# Patient Record
Sex: FEMALE | Race: WHITE
[De-identification: names, ages, dates, MRNs, and addresses within clinical notes are randomized per-mention and may not be internally consistent; named-entity substitution may affect disease eponyms.]

---

## 2020-06-22 ENCOUNTER — HOSPITAL ENCOUNTER (OUTPATIENT)
Dept: HOSPITAL 56 - MW.SDS | Age: 65
Discharge: HOME | End: 2020-06-22
Attending: OBSTETRICS & GYNECOLOGY
Payer: COMMERCIAL

## 2020-06-22 VITALS — HEART RATE: 84 BPM | DIASTOLIC BLOOD PRESSURE: 60 MMHG | SYSTOLIC BLOOD PRESSURE: 130 MMHG

## 2020-06-22 DIAGNOSIS — N84.1: Primary | ICD-10-CM

## 2020-06-22 DIAGNOSIS — I10: ICD-10-CM

## 2020-06-22 DIAGNOSIS — Z88.2: ICD-10-CM

## 2020-06-22 DIAGNOSIS — Z91.013: ICD-10-CM

## 2020-06-22 DIAGNOSIS — Z88.1: ICD-10-CM

## 2020-06-22 DIAGNOSIS — N85.8: ICD-10-CM

## 2020-06-22 DIAGNOSIS — G47.30: ICD-10-CM

## 2020-06-22 DIAGNOSIS — F32.9: ICD-10-CM

## 2020-06-22 DIAGNOSIS — Z79.899: ICD-10-CM

## 2020-06-22 DIAGNOSIS — E03.9: ICD-10-CM

## 2020-06-22 DIAGNOSIS — Z91.030: ICD-10-CM

## 2020-06-22 DIAGNOSIS — F41.9: ICD-10-CM

## 2020-06-22 DIAGNOSIS — Z87.891: ICD-10-CM

## 2020-06-22 DIAGNOSIS — Z79.890: ICD-10-CM

## 2020-06-22 PROCEDURE — 85027 COMPLETE CBC AUTOMATED: CPT

## 2020-06-22 PROCEDURE — 58558 HYSTEROSCOPY BIOPSY: CPT

## 2020-06-22 PROCEDURE — 36415 COLL VENOUS BLD VENIPUNCTURE: CPT

## 2020-06-22 NOTE — PCM.PREANE
Preanesthetic Assessment





- Anesthesia/Transfusion/Family Hx


Anesthesia History: Prior Anesthesia Without Reaction


Family History of Anesthesia Reaction: No


Transfusion History: No Prior Transfusion(s)


Intubation History: Unknown





- Review of Systems


General: No Symptoms


Pulmonary: No Symptoms


Cardiovascular: No Symptoms


Gastrointestinal: No Symptoms


Neurological: No Symptoms


Other: Reports: None





- Physical Assessment


Vital Signs: 





                                Last Vital Signs











Temp  36.8 C   06/22/20 09:40


 


Pulse  81   06/22/20 09:40


 


Resp  14   06/22/20 09:40


 


BP  140/75   06/22/20 09:40


 


Pulse Ox  99   06/22/20 09:40











Height: 5 ft 6 in


Weight: 73.936 kg


ASA Class: 2


Mental Status: Alert & Oriented x3


Airway Class: Mallampati = 2


Dentition: Reports: Normal Dentition


Thyro-Mental Finger Breadths: 3


Mouth Opening Finger Breadths: 3


ROM/Head Extension: Full


Lungs: Clear to Auscultation, Normal Respiratory Effort


Cardiovascular: Regular Rate, Regular Rhythm





- Lab


Values: 





                             Laboratory Last Values











WBC  5.33 K/uL (4.0-11.0)   06/19/20  11:15    


 


RBC  5.32 M/uL (4.30-5.90)   06/19/20  11:15    


 


Hgb  16.3 g/dL (12.0-16.0)  H  06/19/20  11:15    


 


Hct  47.8 % (36.0-46.0)  H  06/19/20  11:15    


 


MCV  89.8 fL (80.0-98.0)   06/19/20  11:15    


 


MCH  30.6 pg (27.0-32.0)   06/19/20  11:15    


 


MCHC  34.1 g/dL (31.0-37.0)   06/19/20  11:15    


 


RDW Std Deviation  41.4 fl (28.0-62.0)   06/19/20  11:15    


 


RDW Coeff of Arleth  13 % (11.0-15.0)   06/19/20  11:15    


 


Plt Count  217 K/uL (150-400)   06/19/20  11:15    


 


MPV  10.30 fL (7.40-12.00)   06/19/20  11:15    


 


Nucleated RBC %  0.0 /100WBC  06/19/20  11:15    


 


Nucleated RBCs #  0 K/uL  06/19/20  11:15    














- Allergies


Allergies/Adverse Reactions: 


                                    Allergies











Allergy/AdvReac Type Severity Reaction Status Date / Time


 


adhesive tape Allergy  Blisters Verified 06/17/20 13:50


 


erythromycin base Allergy  Anaphylactic Verified 06/17/20 13:50





[Erythromycin Base]   Shock  


 


iodine Allergy  Anaphylactic Verified 06/17/20 13:50





   Shock  


 


peanut Allergy  Anaphylactic Verified 06/17/20 13:50





   Shock  


 


shellfish derived Allergy  Anaphylactic Verified 06/17/20 13:50





   Shock  


 


Sulfa (Sulfonamide Allergy  Anaphylactic Verified 06/17/20 13:50





Antibiotics)   Shock  


 


tree nut Allergy  Anaphylactic Verified 06/17/20 13:50





   Shock  


 


venom-honey bee Allergy  Cannot Verified 06/17/20 13:50





[bee venom (honey bee)]   Remember  


 


seafood Allergy  Anaphylactic Uncoded 06/17/20 13:50





   Shock  














- Blood


Blood Available: No





- Anesthesia Plan


Pre-Op Medication Ordered: None





- Acknowledgements


Anesthesia Type Planned: General Anesthesia


Pt an Appropriate Candidate for the Planned Anesthesia: Yes


Alternatives and Risks of Anesthesia Discussed w Pt/Guardian: Yes


Pt/Guardian Understands and Agrees with Anesthesia Plan: Yes





PreAnesthesia Questionnaire


HEENT History: Reports: Other (See Below)


Other HEENT History: wears glasses


Cardiovascular History: Reports: Arrhythmia, Hypertension


Other Cardiovascular History: takes Metoprolol for tachycardia


Gastrointestinal History: Reports: Other (See Below)


Other Gastrointestinal History: occasional heartburn- takes Tums


Neurological History: Reports: Migraines


Psychiatric History: Reports: Anxiety, Depression


Endocrine/Metabolic History: Reports: Hypothyroidism


Oncologic (Cancer) History: Reports: Thyroid ('80)





- Past Surgical History


Head Surgeries/Procedures: Reports: None


HEENT Surgical History: Reports: Tonsillectomy


GI Surgical History: Reports: Colonoscopy


Female  Surgical History: Reports: Cystectomy, Other (See Below)


Other Female  Surgeries/Procedures: removal of ovarian cyst with salpinge

ctomy, hysterectomy x2 '10 and '11, vaginal septum repair


Endocrine Surgical History: Reports: Thyroidectomy





- SUBSTANCE USE


Smoking Status *Q: Former Smoker


Tobacco Use Within Last Twelve Months: No


Recreational Drug Use History: No





- HOME MEDS


Home Medications: 


                                    Home Meds





Acetaminophen/Diphenhydramine [Tylenol Pm Ex-Strength Caplet] 1 tab PO BEDTIME 

PRN 06/17/20 [History]


Estradiol [Sue] 1 patch TOP ASDIRECTED 06/17/20 [History]


FLUoxetine [PROzac] 40 mg PO BEDTIME 06/17/20 [History]


Levothyroxine [Synthroid] 100 mcg PO QAM 06/17/20 [History]


Loratadine [Claritin] 10 mg PO DAILY PRN 06/17/20 [History]


Metoprolol Succinate 50 mg PO BEDTIME 06/17/20 [History]


Rizatriptan Benzoate [Rizatriptan] 10 mg PO ASDIRECTED PRN 06/17/20 [History]


medroxyPROGESTERone [Provera] 10 mg PO BEDTIME 06/17/20 [History]











- CURRENT (IN HOUSE) MEDS


Current Meds: 





                               Current Medications





Lactated Ringer's (Ringers, Lactated)  1,000 mls @ 100 mls/hr IV ASDIRECTED KAMILLE


Sodium Chloride (Saline Flush)  10 ml FLUSH ASDIRECTED PRN


   PRN Reason: Keep Vein Open


Sodium Chloride (Saline Flush)  2.5 ml FLUSH ASDIRECTED PRN


   PRN Reason: Keep Vein Open


Sodium Chloride (Normal Saline)  10 ml IV ASDIRECTED PRN


   PRN Reason: IV Use

## 2020-06-22 NOTE — [id number]
Post Anesthesia Note





- EVALUATION WITHIN 48HRS OF ANESTHETIC


Vital Signs in Normal Range: Yes


Patient Participated in Evaluation: Yes


Respiratory Function Stable: Yes


Airway Patent: Yes


Cardiovascular Function Stable: Yes


Hydration Status Stable: Yes


Pain Control Satisfactory: Yes


Nausea and Vomiting Control Satisfactory: Yes


Mental Status Recovered: Yes


Vital Signs: 


                                Last Vital Signs











Temp  36.9 C   06/22/20 12:34


 


Pulse  80   06/22/20 12:34


 


Resp  14   06/22/20 12:34


 


BP  130/66   06/22/20 12:34


 


Pulse Ox  97   06/22/20 12:34














- COMMENTS/OBSERVATIONS


Free Text/Narrative:: 





No anesthesia problems

## 2020-06-22 NOTE — OR
SURGEON:

Cintia Brennan M.D.

 

DATE OF PROCEDURE:  06/22/2020

 

PREOPERATIVE DIAGNOSIS:

Postmenopausal bleeding.

 

POSTOPERATIVE DIAGNOSIS:

Postmenopausal bleeding.

 

PROCEDURE:

Operative hysteroscopy with endocervical polypectomy and fractional D and C.

 

ANESTHESIA:

General LMA.

 

ESTIMATED BLOOD LOSS:

Less than 5 mL.

 

COMPLICATIONS:

None known.

 

FINDINGS:

Endocervical polyp, posterior side, otherwise atrophic- appearing endometrium.

 

DISPOSITION:

The patient to PACU, stable.

 

SPECIMEN:

To pathology.

 

PROCEDURE IN DETAIL:

Debra is a 65-year-old postmenopausal female who has had an episode of

postmenopausal bleeding.  Pelvic sonogram reveals a thickened endometrium.

Therefore, further evaluation with hysteroscopy has been advised.  She agrees to

proceed in this direction.  Risks of procedure have been discussed with her.

Proper consent obtained.

 

The patient was taken to the operating room where she underwent general LMA, was

placed in modified dorsal lithotomy position, prepped and draped in the usual

sterile fashion.  SCDs to the lower extremities.  Bladder was drained.  Time-out

was performed.

 

Speculum was introduced in the vagina.  Anterior lip of the cervix was

visualized.  Cervix was gently dilated to 6 mm using MyoSure hysteroscope.  This

was introduced using normal saline as distention media.  The uterine cavity was

able to be visualized.  The right ostia followed by the left ostia was able to

be visualized.  Photographs taken.  Fundus, lower uterine segment, and

endocervix are now visualized.  There is a small polypoid lesion in the

endocervical portion of the uterine cavity.  MyoSure  was used to resect the

endometrium and in the region of the endocervical polyp.  Specimen will be sent

to pathology.  A gentle fractional D and C was now performed with a curettage of

the endocervical tissue followed by curettage of the endometrium.  A specimen

will be sent to pathology.  The patient has tolerated the procedure well

overall.  She will go to PACU in stable condition.  All instruments removed from

the vagina.  Sponge and instrument count were correct.

 

 

BERNARDO / JENNY

DD:  06/22/2020 11:56:13

DT:  06/22/2020 13:32:37

Job #:  [id number]

## 2020-06-22 NOTE — PCM.POSTAN
POST ANESTHESIA ASSESSMENT





- MENTAL STATUS


Mental Status: Alert, Oriented





- VITAL SIGNS


Vital Signs: 


                                Last Vital Signs











Temp  36.7 C   06/22/20 11:59


 


Pulse  83   06/22/20 12:26


 


Resp  12   06/22/20 12:26


 


BP  134/69   06/22/20 12:26


 


Pulse Ox  96   06/22/20 12:26














- RESPIRATORY


Respiratory Status: Respiratory Rate WNL, Airway Patent, O2 Saturation Stable





- CARDIOVASCULAR


CV Status: Pulse Rate WNL, Blood Pressure Stable





- GASTROINTESTINAL


GI Status: No Symptoms





- PAIN


Pain Score: 0





- POST OP HYDRATION


Hydration Status: Adequate & Stable





- OBSERVATIONS


Free Text/Narrative:: 





No anesthesia problems

## 2020-06-22 NOTE — PCM.OPNOTE
- General Post-Op/Procedure Note


Date of Surgery/Procedure: 06/22/20


Operative Procedure(s): Operative hysteroscopy with polypectomy.  Fractional D&C


Findings: 





Endocervical polyp otherwise atrophic endometrium 


Pre Op Diagnosis: Postmenopausal bleeding


Post-Op Diagnosis: Same


Anesthesia Technique: General LMA


Primary Surgeon: Cintia Brennan


Pathology: 





Endocervical polyp


Endometrial and endocervical curretings


Fluid Replacement, Intraop: 500


EBL in mLs: 5 (FLuid deficit 300Ml NS)


Complications: none known


Condition: Good


Free Text/Narrative:: 


Dictation [id number]

## 2021-03-02 ENCOUNTER — HOSPITAL ENCOUNTER (OUTPATIENT)
Dept: HOSPITAL 56 - MW.SDS | Age: 66
Discharge: HOME | End: 2021-03-02
Attending: SURGERY
Payer: MEDICARE

## 2021-03-02 VITALS — HEART RATE: 84 BPM | SYSTOLIC BLOOD PRESSURE: 131 MMHG | DIASTOLIC BLOOD PRESSURE: 64 MMHG

## 2021-03-02 DIAGNOSIS — K80.64: Primary | ICD-10-CM

## 2021-03-02 DIAGNOSIS — Z79.84: ICD-10-CM

## 2021-03-02 DIAGNOSIS — Z91.010: ICD-10-CM

## 2021-03-02 DIAGNOSIS — E03.9: ICD-10-CM

## 2021-03-02 DIAGNOSIS — Z79.899: ICD-10-CM

## 2021-03-02 DIAGNOSIS — Z91.048: ICD-10-CM

## 2021-03-02 DIAGNOSIS — Z88.8: ICD-10-CM

## 2021-03-02 DIAGNOSIS — Z98.890: ICD-10-CM

## 2021-03-02 DIAGNOSIS — Z91.013: ICD-10-CM

## 2021-03-02 DIAGNOSIS — E78.00: ICD-10-CM

## 2021-03-02 DIAGNOSIS — Z87.891: ICD-10-CM

## 2021-03-02 DIAGNOSIS — E11.9: ICD-10-CM

## 2021-03-02 DIAGNOSIS — Z88.2: ICD-10-CM

## 2021-03-02 DIAGNOSIS — Z91.030: ICD-10-CM

## 2021-03-02 DIAGNOSIS — Z79.890: ICD-10-CM

## 2021-03-02 PROCEDURE — 88304 TISSUE EXAM BY PATHOLOGIST: CPT

## 2021-03-02 PROCEDURE — 47562 LAPAROSCOPIC CHOLECYSTECTOMY: CPT

## 2021-03-02 NOTE — OR
SURGEON:

DALIA CHAPPELL MD

 

DATE OF PROCEDURE:  03/02/2021

 

PREOPERATIVE DIAGNOSIS:

Symptomatic cholelithiasis.

 

POSTOPERATIVE DIAGNOSIS:

Chronic cholecystitis secondary to biliary calculus.

 

PROCEDURE PERFORMED:

Laparoscopic cholecystectomy.

 

PRIMARY SURGEON:

Dalia Chappell MD

 

ANESTHESIA:

General endotracheal anesthesia.

 

FLUIDS:

1300 mL of crystalloid.

 

ESTIMATED BLOOD LOSS:

10 mL.

 

URINE OUTPUT:

120 mL.

 

FINDINGS:

Gallbladder with dense adhesions proximally to the liver capsule.  Omental

adhesions to the abdominal wall.

 

COMPLICATIONS:

None.

 

INDICATIONS:

The patient is a 66-year-old female who presents with symptomatic

cholelithiasis.  I explained the need for a laparoscopic, possible open,

cholecystectomy.  The patient and I discussed the procedure; expected

perioperative course; and the risks including bleeding, infection, or damage to

surrounding structures.  The patient verbalized understanding and wishes to

proceed.

 

PROCEDURE IN DETAIL:

The patient was brought into the OR and placed on the OR table in supine

position.  A time-out was completed verifying the patient's name, age, date of

birth, allergies, and procedure to be performed.  General endotracheal

anesthesia was induced.  The left arm was tucked to the patient's side and a

Gates catheter placed.  The abdomen was prepped and draped in usual standard

fashion.  I anesthetized the infraumbilical fold with 0.5% Marcaine plain.  An

11 blade was used to make an incision along the infraumbilical fold.  Cautery

was used to dissect down to the subcutaneous fat layer.  I then bluntly

dissected down to the fascia.  The anterior fascia was elevated and incised

sharply with curved Washburn scissors.  I then identified the posterior rectus

sheath.  This was elevated and incised as well.  I then identified the

peritoneum.  This was grasped with hemostats and incised sharply.  Entry into

the abdomen was palpated digitally.  Stay sutures were placed on either side of

the fascia using 0 Vicryl suture.  A 12 mm Kendall trocar was inserted in the

abdomen.  The abdomen was insufflated and a 5 mm 30-degree scope was inserted.

I closely inspected the area underneath my initial trocar placement.  No damage

to surrounding structures was noted.  The patient was placed into reverse

Trendelenburg position and airplaned slightly to the left.  5 mm trocars were

placed in the following locations under direct visualization; one in the

epigastric area, one in the right flank, and one 2 fingerbreadths below the

right subcostal margin in the midclavicular line.  The patient was noted to have

adhesions of the omentum up along the upper midline.  These were taken down with

hook cautery.  I then grasped the top of my gallbladder with an atraumatic

grasper and elevated the gallbladder anteriorly.  The infundibulum had some

adhesions to the lateral liver capsule.  Using hook cautery, I was able to take

some of these down.  I then turned my attention to the medial edge.  I began my

dissection of the critical view using a combination of blunt dissection as well

as hook cautery.  Again, the patient had dense adhesions around the

infundibulum.  I was able to clear away half of the cystic plate, which allowed

me more mobility with my proximal dissection.  I was able to completely dissect

free the cystic artery.  The cystic duct was somewhat thickened and had dense

adhesions around it.  I took these down meticulously.  Once I was assured of my

critical view, I doubly clipped and ligated my cystic artery.  I then continued

dissection down the cystic duct.  A small rent was made at the juncture of the

gallbladder and the cystic duct.  I triply clipped and ligated the cystic duct,

taking care to be sure that the clips were all the way across.  A photograph was

taken during this dissection showing my critical view.  I then continued my

dissection up the remainder of the cystic plate using hook cautery.  Once the

gallbladder was freed from the remaining attachments, I placed it in an Endo

Catch bag.  I then irrigated my operative field and suctioned this out.  The

liver bed was elevated, and I inspected my clips.  They appeared to be in good

position and there was no evidence of any bile leakage.  A photograph of this

was taken.  The 5 mm trocars were then removed under direct visualization, the

abdomen allowed to desufflate.  The 12 mm Kendall trocar and the Endo Catch bag

were removed through the infraumbilical port site.  The gallbladder was sent to

pathology.  The fascia at the infraumbilical port site was closed with

interrupted 0 Vicryl sutures.  The subcutaneous fat layer was closed with

interrupted 3-0 Vicryl suture.  The skin was closed with a running 4-0 Monocryl

suture.  The skin at the 5 mm trocar sites was closed with interrupted 4-0

Monocryl sutures.  Dermabond and sterile dressings were applied.  The patient

tolerated the procedure well, was extubated and taken to PACU in stable

condition.  All counts were complete and correct at the end of the case.

 

 

RANJIT ALVARADO

DD:  03/02/2021 12:20:24

DT:  03/02/2021 13:02:56

Job #:  [id number]

## 2021-03-02 NOTE — PCM48HPAN
Post Anesthesia Note





- EVALUATION WITHIN 48HRS OF ANESTHETIC


Vital Signs in Normal Range: Yes


Patient Participated in Evaluation: Yes


Respiratory Function Stable: Yes


Airway Patent: Yes


Cardiovascular Function Stable: Yes


Hydration Status Stable: Yes


Pain Control Satisfactory: Yes


Nausea and Vomiting Control Satisfactory: Yes


Mental Status Recovered: Yes


Vital Signs: 


                                Last Vital Signs











Temp  36.5 C   03/02/21 12:55


 


Pulse  77   03/02/21 13:50


 


Resp  15   03/02/21 13:50


 


BP  124/60   03/02/21 13:50


 


Pulse Ox  96   03/02/21 13:50

## 2021-03-02 NOTE — PCM.POSTAN
POST ANESTHESIA ASSESSMENT





- MENTAL STATUS


Mental Status: Alert, Oriented





- VITAL SIGNS


Vital Signs: 


                                Last Vital Signs











Temp  36.5 C   03/02/21 12:55


 


Pulse  72   03/02/21 12:55


 


Resp  14   03/02/21 12:55


 


BP  116/56 L  03/02/21 12:55


 


Pulse Ox  96   03/02/21 12:55














- RESPIRATORY


Respiratory Status: Respiratory Rate WNL, Airway Patent, O2 Saturation Stable





- CARDIOVASCULAR


CV Status: Pulse Rate WNL, Blood Pressure Stable





- GASTROINTESTINAL


GI Status: No Symptoms





- PAIN


Pain Score: 0





- POST OP HYDRATION


Hydration Status: Adequate & Stable





- OBSERVATIONS


Free Text/Narrative:: 





No anesthesia problem

## 2021-03-02 NOTE — PCM.OPNOTE
- General Post-Op/Procedure Note


Date of Surgery/Procedure: 03/02/21


Operative Procedure(s): Laparoscopic cholecystectomy


Findings: 


Gallbladder with adhesions to the liver capsule proximally. Omentum adhered to 

superior midline. 


Pre Op Diagnosis: Symptomatic cholelithiasis


Post-Op Diagnosis: Same


Anesthesia Technique: General ET Tube


Primary Surgeon: Dalia Davidson


Pathology: 





gallbladder


Fluid Replacement, Intraop: 1,300


Output, Urine Amount: 120


EBL in mLs: 10


Condition: Good

## 2021-03-02 NOTE — PCM.PREANE
Preanesthetic Assessment





- Anesthesia/Transfusion/Family Hx


Anesthesia History: Prior Anesthesia Without Reaction


Other Type of Anesthesia Reaction Comment: "my mother had problems with N&V and 

hard time waking up"


Family History of Anesthesia Reaction: No


Transfusion History: No Prior Transfusion(s)


Intubation History: Unknown





- Review of Systems


General: No Symptoms


Pulmonary: No Symptoms


Cardiovascular: No Symptoms


Gastrointestinal: Abdominal Pain


Neurological: No Symptoms


Other: Reports: None





- Physical Assessment


Vital Signs: 





                                Last Vital Signs











Temp  36.1 C   03/02/21 08:19


 


Pulse  82   03/02/21 08:19


 


Resp  15   03/02/21 08:19


 


BP  133/67   03/02/21 08:19


 


Pulse Ox  97   03/02/21 08:19











Height: 5 ft 6 in


Weight: 71.668 kg


ASA Class: 2


Mental Status: Alert & Oriented x3


Airway Class: Mallampati = 2


Dentition: Reports: Normal Dentition


Thyro-Mental Finger Breadths: 3


Mouth Opening Finger Breadths: 3


ROM/Head Extension: Limited/Partial


Lungs: Clear to Auscultation, Normal Respiratory Effort


Cardiovascular: Regular Rate, Regular Rhythm





- Allergies


Allergies/Adverse Reactions: 


                                    Allergies











Allergy/AdvReac Type Severity Reaction Status Date / Time


 


adhesive tape Allergy  Blisters Verified 02/24/21 08:34


 


erythromycin base Allergy  Anaphylactic Verified 02/24/21 08:34





[Erythromycin Base]   Shock  


 


iodine Allergy  Anaphylactic Verified 06/17/20 13:50





   Shock  


 


Iodine and Iodide Containing Allergy  Rash Verified 02/24/21 08:34





Produc     


 


peanut Allergy  Anaphylactic Verified 02/24/21 08:34





   Shock  


 


shellfish derived Allergy  Anaphylactic Verified 02/24/21 08:34





   Shock  


 


Sulfa (Sulfonamide Allergy  Anaphylactic Verified 02/24/21 08:34





Antibiotics)   Shock  


 


tree nut Allergy  Anaphylactic Verified 02/24/21 08:34





   Shock  


 


venom-honey bee Allergy  Cannot Verified 02/24/21 08:34





[bee venom (honey bee)]   Remember  


 


seafood Allergy  Anaphylactic Uncoded 02/24/21 08:34





   Shock  














- Blood


Blood Available: No





- Anesthesia Plan


Pre-Op Medication Ordered: None





- Acknowledgements


Anesthesia Type Planned: General Anesthesia


Pt an Appropriate Candidate for the Planned Anesthesia: Yes


Alternatives and Risks of Anesthesia Discussed w Pt/Guardian: Yes


Pt/Guardian Understands and Agrees with Anesthesia Plan: Yes





PreAnesthesia Questionnaire


HEENT History: Reports: Other (See Below)


Other HEENT History: wears glasses


Cardiovascular History: Reports: Arrhythmia, High Cholesterol


Other Cardiovascular History: takes Metoprolol for tachycardia


Respiratory History: Reports: None


Gastrointestinal History: Reports: Other (See Below)


Other Gastrointestinal History: occasional heartburn, intermittent RUQ pain


Genitourinary History: Reports: None


Musculoskeletal History: Reports: Back Pain, Chronic


Neurological History: Reports: Migraines


Psychiatric History: Reports: Anxiety, Depression


Endocrine/Metabolic History: Reports: Diabetes, Type II, Hypothyroidism


Hematologic History: Reports: None


Immunologic History: Reports: None


Oncologic (Cancer) History: Reports: Thyroid


Dermatologic History: Reports: None





- Past Surgical History


Head Surgeries/Procedures: Reports: None


HEENT Surgical History: Reports: Tonsillectomy


Cardiovascular Surgical History: Reports: None


Respiratory Surgical History: Reports: None


GI Surgical History: Reports: Appendectomy, Colonoscopy


Female  Surgical History: Reports: Cystectomy, Other (See Below)


Other Female  Surgeries/Procedures: laparotomy for removal of ovarian cyst & 

salpingectomy, hysteroscopy with excision of cervical polyp,


Endocrine Surgical History: Reports: Thyroidectomy


Other Endocrine Surgeries/Procedures: thyroidectomy for thyroid cancer


Neurological Surgical History: Reports: None


Musculoskeletal Surgical History: Reports: None


Oncologic Surgical History: Reports: Other (See Below)


Other Oncologic Surgeries/Procedures: thyroidectomy


Dermatological Surgical History: Reports: None





- SUBSTANCE USE


Tobacco Use Status *Q: Former Tobacco User (40 years ago)


Tobacco Use Within Last Twelve Months: No





- HOME MEDS


Home Medications: 


                                    Home Meds





FLUoxetine [PROzac] 40 mg PO BEDTIME 06/17/20 [History]


Levothyroxine [Synthroid] 100 mcg PO QAM 06/17/20 [History]


Loratadine [Claritin] 10 mg PO DAILY PRN 06/17/20 [History]


Metoprolol Succinate 50 mg PO BEDTIME 06/17/20 [History]


Rizatriptan Benzoate [Rizatriptan] 10 mg PO ASDIRECTED PRN 06/17/20 [History]


Ibuprofen [Advil] 1 tab PO ASDIRECTED PRN 02/24/21 [History]


metFORMIN HCl [Metformin HCl ER] 500 mg PO BID 02/24/21 [History]











- CURRENT (IN HOUSE) MEDS


Current Meds: 





                               Current Medications





Lactated Ringer's (Ringers, Lactated)  1,000 mls @ 125 mls/hr IV ASDIRECTED KAMILLE


   Last Admin: 03/02/21 08:50 Dose:  125 mls/hr


   Documented by: 


Cefazolin Sodium/Dextrose 2 gm (/ Premix)  50 mls @ 100 mls/hr IV ONETIME ONE


   Stop: 03/02/21 09:29


Sodium Chloride (Saline Flush)  2.5 ml FLUSH ASDIRECTED PRN


   PRN Reason: Keep Vein Open


Sodium Chloride (Normal Saline)  10 ml IV ASDIRECTED PRN


   PRN Reason: IV Use


Sodium Chloride (Saline Flush)  10 ml FLUSH ASDIRECTED PRN


   PRN Reason: Keep Vein Open





Discontinued Medications





Dexamethasone (Dexamethasone) Confirm Administered Dose 20 mg .ROUTE .STK-MED 

ONE


   Stop: 03/02/21 08:50


Fentanyl (Sublimaze) Confirm Administered Dose 100 mcg .ROUTE .STK-MED ONE


   Stop: 03/02/21 08:50


Hydromorphone HCl (Dilaudid) Confirm Administered Dose 2 mg .ROUTE .STK-MED ONE


   Stop: 03/02/21 08:50


Ketamine HCl (Ketalar) Confirm Administered Dose 500 mg .ROUTE .STK-MED ONE


   Stop: 03/02/21 08:50


Lidocaine (Xylocaine-Mpf 2%) Confirm Administered Dose 5 ml .ROUTE .STK-MED ONE


   Stop: 03/02/21 08:50


Midazolam HCl (Versed 1 Mg/Ml) Confirm Administered Dose 2 mg .ROUTE .STK-MED 

ONE


   Stop: 03/02/21 08:56


Ondansetron HCl (Zofran) Confirm Administered Dose 4 mg .ROUTE .STK-MED ONE


   Stop: 03/02/21 08:50


Propofol (Diprivan  20 Ml) Confirm Administered Dose 600 mg .ROUTE .STK-MED ONE


   Stop: 03/02/21 08:50